# Patient Record
Sex: FEMALE | Race: WHITE | NOT HISPANIC OR LATINO | URBAN - METROPOLITAN AREA
[De-identification: names, ages, dates, MRNs, and addresses within clinical notes are randomized per-mention and may not be internally consistent; named-entity substitution may affect disease eponyms.]

---

## 2024-11-20 ENCOUNTER — OFFICE VISIT (OUTPATIENT)
Dept: PRIMARY CARE | Facility: EXTERNAL LOCATION | Age: 49
End: 2024-11-20

## 2024-11-20 VITALS
HEART RATE: 94 BPM | DIASTOLIC BLOOD PRESSURE: 86 MMHG | OXYGEN SATURATION: 96 % | SYSTOLIC BLOOD PRESSURE: 133 MMHG | BODY MASS INDEX: 29 KG/M2 | WEIGHT: 165 LBS | TEMPERATURE: 98 F

## 2024-11-20 DIAGNOSIS — H01.006 BLEPHARITIS OF BOTH EYES, UNSPECIFIED EYELID, UNSPECIFIED TYPE: Primary | ICD-10-CM

## 2024-11-20 DIAGNOSIS — H01.003 BLEPHARITIS OF BOTH EYES, UNSPECIFIED EYELID, UNSPECIFIED TYPE: Primary | ICD-10-CM

## 2024-11-20 PROBLEM — F17.200 TOBACCO DEPENDENCE: Status: ACTIVE | Noted: 2024-11-20

## 2024-11-20 PROBLEM — E78.5 HYPERLIPIDEMIA: Status: ACTIVE | Noted: 2024-11-20

## 2024-11-20 PROBLEM — E11.9 DIABETES MELLITUS (MULTI): Status: ACTIVE | Noted: 2024-11-20

## 2024-11-20 PROBLEM — F11.10 HEROIN ABUSE (MULTI): Status: ACTIVE | Noted: 2024-11-20

## 2024-11-20 PROBLEM — F41.9 ANXIETY: Status: ACTIVE | Noted: 2024-11-20

## 2024-11-20 RX ORDER — METFORMIN HYDROCHLORIDE 500 MG/1
TABLET, EXTENDED RELEASE ORAL
COMMUNITY
Start: 2024-10-25

## 2024-11-20 RX ORDER — FLUTICASONE PROPIONATE AND SALMETEROL 250; 50 UG/1; UG/1
1 POWDER RESPIRATORY (INHALATION) 2 TIMES DAILY
COMMUNITY
Start: 2024-09-26

## 2024-11-20 RX ORDER — TIOTROPIUM BROMIDE INHALATION SPRAY 1.56 UG/1
2 SPRAY, METERED RESPIRATORY (INHALATION)
COMMUNITY
Start: 2024-09-26

## 2024-11-20 RX ORDER — PANTOPRAZOLE SODIUM 40 MG/1
1 TABLET, DELAYED RELEASE ORAL
COMMUNITY
Start: 2024-02-01

## 2024-11-20 RX ORDER — LOSARTAN POTASSIUM 50 MG/1
50 TABLET ORAL
COMMUNITY

## 2024-11-20 RX ORDER — GABAPENTIN 300 MG/1
1 CAPSULE ORAL DAILY
COMMUNITY

## 2024-11-20 RX ORDER — ERYTHROMYCIN 5 MG/G
OINTMENT OPHTHALMIC 4 TIMES DAILY
Qty: 3.5 G | Refills: 0 | Status: SHIPPED | OUTPATIENT
Start: 2024-11-20 | End: 2024-11-27

## 2024-11-20 RX ORDER — ALBUTEROL SULFATE 90 UG/1
2-4 INHALANT RESPIRATORY (INHALATION) EVERY 2 HOUR PRN
COMMUNITY
Start: 2024-09-26

## 2024-11-20 ASSESSMENT — ENCOUNTER SYMPTOMS
EYE ITCHING: 0
CHILLS: 0
COUGH: 0
FEVER: 0
EYE PAIN: 0
EYE REDNESS: 1
SORE THROAT: 0
SINUS PAIN: 0
EYE DISCHARGE: 1
WHEEZING: 0
PALPITATIONS: 0
SHORTNESS OF BREATH: 0
PHOTOPHOBIA: 0

## 2024-11-20 ASSESSMENT — PATIENT HEALTH QUESTIONNAIRE - PHQ9
1. LITTLE INTEREST OR PLEASURE IN DOING THINGS: NOT AT ALL
2. FEELING DOWN, DEPRESSED OR HOPELESS: NOT AT ALL
SUM OF ALL RESPONSES TO PHQ9 QUESTIONS 1 AND 2: 0

## 2024-11-20 NOTE — PROGRESS NOTES
Subjective   Patient ID: Karlene Newman is a 49 y.o. female who presents for Eye Problem (Approx 1 month/Drainage throughout the day, feels goopy all of the time/Had to wipe with cloth to open in morning/).    HPI:  Complains of eyes draining. In the morning is yellow and crusted. During the day is white and watery.   Eyes have been slight red as well.   No itchiness but can be irritated.   No pain. Occasional eyelid pressure.   No FB sensation.   No light sensitivity.   Has had symptoms for a month.   Does not wear contacts.   No other symptoms.     No Known Allergies    Current Outpatient Medications   Medication Sig Dispense Refill    albuterol 90 mcg/actuation inhaler Inhale 2-4 puffs every 2 hours if needed.      fluticasone propion-salmeteroL (Advair Diskus) 250-50 mcg/dose diskus inhaler Inhale 1 puff twice a day.      gabapentin (Neurontin) 300 mg capsule Take 1 tablet by mouth once daily.      losartan (Cozaar) 50 mg tablet Take 1 tablet (50 mg) by mouth once daily.      metFORMIN  mg 24 hr tablet       pantoprazole (ProtoNix) 40 mg EC tablet Take 1 tablet (40 mg) by mouth once daily.      Spiriva Respimat 1.25 mcg/actuation inhaler Inhale 2 puffs once daily.       No current facility-administered medications for this visit.        Past Medical History:   Diagnosis Date    Asthma     Diabetes mellitus (Multi)     GERD (gastroesophageal reflux disease)     Heroin abuse (Multi) 11/20/2024    Hyperlipidemia 11/20/2024    Hypertension     Tobacco dependence 11/20/2024       Past Surgical History:   Procedure Laterality Date    ECTOPIC PREGNANCY SURGERY      TUBAL LIGATION         Review of Systems   Constitutional:  Negative for chills and fever.   HENT:  Negative for congestion, sinus pain and sore throat.    Eyes:  Positive for discharge and redness. Negative for photophobia, pain, itching and visual disturbance.   Respiratory:  Negative for cough, shortness of breath and wheezing.     Cardiovascular:  Negative for chest pain and palpitations.       Objective   Visit Vitals  /86   Pulse 94   Temp 36.7 °C (98 °F)   Wt 74.8 kg (165 lb)   LMP 11/10/2024   SpO2 96%   BMI 29.00 kg/m²   OB Status Having periods   Smoking Status Every Day   BSA 1.83 m²       Physical Exam  Constitutional:       General: She is not in acute distress.     Appearance: Normal appearance. She is not ill-appearing or toxic-appearing.      Comments: Smiling, cooperative, NAD. Looks well   HENT:      Right Ear: Tympanic membrane, ear canal and external ear normal.      Left Ear: Tympanic membrane, ear canal and external ear normal.      Nose: Nose normal.      Mouth/Throat:      Mouth: Mucous membranes are moist.      Pharynx: Oropharynx is clear.   Eyes:      General:         Right eye: Discharge (clear) present.         Left eye: Discharge (clear) present.     Extraocular Movements: Extraocular movements intact.      Pupils: Pupils are equal, round, and reactive to light.      Comments: Conjunctiva injected (mildly red) JENNIFER, EOMI, PERRLA, scant clear discharge noted    Pulmonary:      Effort: Pulmonary effort is normal.      Breath sounds: Normal breath sounds.   Musculoskeletal:      Cervical back: Neck supple.   Neurological:      Mental Status: She is alert.   Psychiatric:         Mood and Affect: Mood normal.         Behavior: Behavior normal.         Thought Content: Thought content normal.       Assessment/Plan   Diagnoses and all orders for this visit:  Blepharitis of both eyes, unspecified eyelid, unspecified type  -     erythromycin (Romycin) 5 mg/gram (0.5 %) ophthalmic ointment; Apply to affected eye(s) 4 times a day for 7 days. Apply 0.5in per dose.    - Discussed cleaning/wiping eyes.   - Educational information on blepharitis printed and given to patient.   - Prescription sent to pharmacy and possible side effects discussed.   - Recommend OTC allergy eye drops as needed.   - Follow up in 1 week if no  improvement. Sooner with concerns or worsening symptoms.

## 2024-11-21 NOTE — PATIENT INSTRUCTIONS
Instructed to:  - Wash your hands often with soap and water.  - Try not to touch your eyes.  - Avoid sharing towels, bedding, or other personal items.  - Avoid wearing contacts until symptoms completely resolve.    Call your healthcare provider or go to an Urgent Care if:  - Your symptoms are worsening.  - You have trouble seeing clearly after blinking.  - Your eye is still red or has drainage after 3 days.  - The drainage/discharge from your eye is yellow/green  - You have eye pain that is getting worse.  - You have light sensitivity and are having trouble keeping your eye open  - You feel there may be something in your eye.    - Any worsening symptoms.

## 2024-12-18 DIAGNOSIS — E78.00 PURE HYPERCHOLESTEROLEMIA, UNSPECIFIED: ICD-10-CM

## 2024-12-18 DIAGNOSIS — E11.65 TYPE 2 DIABETES MELLITUS WITH HYPERGLYCEMIA (MULTI): Primary | ICD-10-CM

## 2025-01-07 ENCOUNTER — OFFICE VISIT (OUTPATIENT)
Dept: OPHTHALMOLOGY | Facility: CLINIC | Age: 50
End: 2025-01-07
Payer: COMMERCIAL

## 2025-01-07 DIAGNOSIS — H25.813 COMBINED FORMS OF AGE-RELATED CATARACT OF BOTH EYES: ICD-10-CM

## 2025-01-07 DIAGNOSIS — H04.123 DRY EYES: ICD-10-CM

## 2025-01-07 DIAGNOSIS — H52.7 REFRACTIVE ERROR: ICD-10-CM

## 2025-01-07 DIAGNOSIS — E11.9 TYPE 2 DIABETES MELLITUS WITHOUT COMPLICATION, WITHOUT LONG-TERM CURRENT USE OF INSULIN (MULTI): Primary | ICD-10-CM

## 2025-01-07 DIAGNOSIS — H02.831 DERMATOCHALASIS OF BOTH UPPER EYELIDS: ICD-10-CM

## 2025-01-07 DIAGNOSIS — H02.834 DERMATOCHALASIS OF BOTH UPPER EYELIDS: ICD-10-CM

## 2025-01-07 PROCEDURE — 99214 OFFICE O/P EST MOD 30 MIN: CPT | Performed by: OPHTHALMOLOGY

## 2025-01-07 PROCEDURE — 99204 OFFICE O/P NEW MOD 45 MIN: CPT | Performed by: OPHTHALMOLOGY

## 2025-01-07 ASSESSMENT — EXTERNAL EXAM - LEFT EYE: OS_EXAM: NORMAL

## 2025-01-07 ASSESSMENT — PATIENT HEALTH QUESTIONNAIRE - PHQ9
2. FEELING DOWN, DEPRESSED OR HOPELESS: NOT AT ALL
SUM OF ALL RESPONSES TO PHQ9 QUESTIONS 1 AND 2: 0
1. LITTLE INTEREST OR PLEASURE IN DOING THINGS: NOT AT ALL

## 2025-01-07 ASSESSMENT — REFRACTION_WEARINGRX
OS_CYLINDER: -2.75
OD_CYLINDER: -1.75
OD_AXIS: 086
OS_AXIS: 099
OS_ADD: +2.00
OD_SPHERE: -0.75
OD_ADD: +2.00
SPECS_TYPE: BIFOCAL
OS_SPHERE: -0.25

## 2025-01-07 ASSESSMENT — ENCOUNTER SYMPTOMS
HEMATOLOGIC/LYMPHATIC NEGATIVE: 0
ENDOCRINE NEGATIVE: 0
NEUROLOGICAL NEGATIVE: 0
GASTROINTESTINAL NEGATIVE: 0
EYES NEGATIVE: 0
PSYCHIATRIC NEGATIVE: 0
CONSTITUTIONAL NEGATIVE: 0
CARDIOVASCULAR NEGATIVE: 0
ALLERGIC/IMMUNOLOGIC NEGATIVE: 0
RESPIRATORY NEGATIVE: 0
MUSCULOSKELETAL NEGATIVE: 0

## 2025-01-07 ASSESSMENT — SLIT LAMP EXAM - LIDS
COMMENTS: 1+ DERMATOCHALASIS - UPPER LID, 1+ BLEPHARITIS
COMMENTS: 1+ DERMATOCHALASIS - UPPER LID, 1+ BLEPHARITIS

## 2025-01-07 ASSESSMENT — VISUAL ACUITY
OS_CC: 20/20
OS_CC+: -2
METHOD: SNELLEN - LINEAR
OD_PH_CC: 20/50
CORRECTION_TYPE: GLASSES
OD_CC: 20/200

## 2025-01-07 ASSESSMENT — TONOMETRY
OS_IOP_MMHG: 17
OD_IOP_MMHG: 17
IOP_METHOD: GOLDMANN APPLANATION

## 2025-01-07 ASSESSMENT — CUP TO DISC RATIO
OD_RATIO: 0.35
OS_RATIO: 0.35

## 2025-01-07 ASSESSMENT — EXTERNAL EXAM - RIGHT EYE: OD_EXAM: NORMAL

## 2025-01-07 ASSESSMENT — PAIN SCALES - GENERAL: PAINLEVEL_OUTOF10: 0-NO PAIN

## 2025-01-07 NOTE — PROGRESS NOTES
Subjective   Patient ID: Karlene Newman is a 49 y.o. female.      HPI    Intraocular pressure (IOP) [facility], diagnosed as diabetic 15 yrs ago.  States ou tear often and discharge daily     Dilated diabetic exam.  Has not seen an ophthal in years.  Vision has worsened.  OD>>>OS.  No recent changes in health history or meds.    Last edited by Davion Mckeon MD on 1/7/2025  2:52 PM.        No current outpatient medications on file. (Ophthalmology pharm classes)       Current Outpatient Medications (Other)   Medication Sig Dispense Refill    albuterol 90 mcg/actuation inhaler Inhale 2-4 puffs every 2 hours if needed.      fluticasone propion-salmeteroL (Advair Diskus) 250-50 mcg/dose diskus inhaler Inhale 1 puff twice a day.      gabapentin (Neurontin) 300 mg capsule Take 1 tablet by mouth once daily.      losartan (Cozaar) 50 mg tablet Take 1 tablet (50 mg) by mouth once daily.      metFORMIN  mg 24 hr tablet       pantoprazole (ProtoNix) 40 mg EC tablet Take 1 tablet (40 mg) by mouth once daily.      Spiriva Respimat 1.25 mcg/actuation inhaler Inhale 2 puffs once daily.         Objective   Base Eye Exam       Visual Acuity (Snellen - Linear)         Right Left    Dist cc 20/200 20/20 -2    Dist ph cc 20/50       Correction: Glasses              Tonometry (Goldmann Applanation, 1:40 PM)         Right Left    Pressure 17 17              Pupils         Dark Shape React APD    Right 4 Round 2 None    Left 4 Round 2 None              Extraocular Movement         Right Left     Full Full              Dilation       Both eyes: 1% Tropic 2.5% Phen @ 1:39 PM                  Slit Lamp and Fundus Exam       External Exam         Right Left    External Normal Normal              Slit Lamp Exam         Right Left    Lids/Lashes 1+ Dermatochalasis - upper lid, 1+ Blepharitis 1+ Dermatochalasis - upper lid, 1+ Blepharitis    Conjunctiva/Sclera White and quiet White and quiet     Cornea Clear Clear    Anterior Chamber Deep and quiet Deep and quiet    Iris Round and reactive Round and reactive    Lens Trace Nuclear sclerosis Trace Nuclear sclerosis    Anterior Vitreous Vitreous syneresis Vitreous syneresis              Fundus Exam         Right Left    Disc Normal Normal    C/D Ratio 0.35 0.35    Macula Normal; no BDR Normal; no BDR    Vessels Normal Normal    Periphery Normal Normal                  Refraction       Wearing Rx         Sphere Cylinder Axis Add    Right -0.75 -1.75 086 +2.00    Left -0.25 -2.75 099 +2.00      Age: 1yrs    Type: Bifocal                    Assessment/Plan   Problem List Items Addressed This Visit          Eye/Vision problems    Diabetes mellitus (Multi) - Primary    Refractive error     F/u next avail refraction.           Dry eyes     Begin daily art tear usage.           Combined forms of age-related cataract of both eyes    Dermatochalasis of both upper eyelids

## 2025-01-07 NOTE — LETTER
January 7, 2025    Lorelei Umanzor MD  [id number]  [facility] [address]     Patient: Karlene Newman   YOB: 1975   Date of Visit: 1/7/2025       Dear Dr. Lorelei Umanzor MD:    Thank you for referring Karlene Newman to me for evaluation. Here is my assessment and plan of care:    Assessment/Plan:  Diagnoses and all orders for this visit:  Type 2 diabetes mellitus without complication, without long-term current use of insulin (Multi) (Primary)  Dry eyes  Combined forms of age-related cataract of both eyes  Dermatochalasis of both upper eyelids  Refractive error    Below you will find my full exam findings. If you have questions, please do not hesitate to call me. I look forward to following Karlene along with you.     No signs of background diabetic retinopathy (BDR) OU.      Sincerely,        Davion Mckeon MD        CC:   No Recipients        Base Eye Exam       Visual Acuity (Snellen - Linear)         Right Left    Dist cc 20/200 20/20 -2    Dist ph cc 20/50       Correction: Glasses              Tonometry (Goldmann Applanation, 1:40 PM)         Right Left    Pressure 17 17              Pupils         Dark Shape React APD    Right 4 Round 2 None    Left 4 Round 2 None              Extraocular Movement         Right Left     Full Full              Dilation       Both eyes: 1% Tropic 2.5% Phen @ 1:39 PM                  Slit Lamp and Fundus Exam       External Exam         Right Left    External Normal Normal              Slit Lamp Exam         Right Left    Lids/Lashes 1+ Dermatochalasis - upper lid, 1+ Blepharitis 1+ Dermatochalasis - upper lid, 1+ Blepharitis    Conjunctiva/Sclera White and quiet White and quiet    Cornea Clear Clear    Anterior Chamber Deep and quiet Deep and quiet    Iris Round and reactive Round and reactive    Lens Trace Nuclear sclerosis Trace Nuclear sclerosis    Anterior Vitreous Vitreous syneresis Vitreous syneresis              Fundus Exam          Right Left    Disc Normal Normal    C/D Ratio 0.35 0.35    Macula Normal; no BDR Normal; no BDR    Vessels Normal Normal    Periphery Normal Normal                  Refraction       Wearing Rx         Sphere Cylinder Axis Add    Right -0.75 -1.75 086 +2.00    Left -0.25 -2.75 099 +2.00      Age: 1yrs    Type: Bifocal

## 2025-01-17 ENCOUNTER — HOSPITAL ENCOUNTER (OUTPATIENT)
Dept: RADIOLOGY | Facility: HOSPITAL | Age: 50
Discharge: HOME | End: 2025-01-17
Payer: COMMERCIAL

## 2025-01-17 ENCOUNTER — LAB (OUTPATIENT)
Dept: LAB | Facility: LAB | Age: 50
End: 2025-01-17
Payer: COMMERCIAL

## 2025-01-17 DIAGNOSIS — E11.65 TYPE 2 DIABETES MELLITUS WITH HYPERGLYCEMIA (MULTI): Primary | ICD-10-CM

## 2025-01-17 DIAGNOSIS — R05.9 COUGH, UNSPECIFIED: ICD-10-CM

## 2025-01-17 DIAGNOSIS — E78.00 PURE HYPERCHOLESTEROLEMIA, UNSPECIFIED: ICD-10-CM

## 2025-01-17 LAB
ALBUMIN SERPL BCP-MCNC: 4.3 G/DL (ref 3.4–5)
ALP SERPL-CCNC: 52 U/L (ref 33–110)
ALT SERPL W P-5'-P-CCNC: 14 U/L (ref 7–45)
ANION GAP SERPL CALCULATED.3IONS-SCNC: 13 MMOL/L (ref 10–20)
AST SERPL W P-5'-P-CCNC: 17 U/L (ref 9–39)
BASOPHILS # BLD AUTO: 0.07 X10*3/UL (ref 0–0.1)
BASOPHILS NFR BLD AUTO: 1.1 %
BILIRUB SERPL-MCNC: 0.4 MG/DL (ref 0–1.2)
BUN SERPL-MCNC: 10 MG/DL (ref 6–23)
CALCIUM SERPL-MCNC: 9.4 MG/DL (ref 8.6–10.3)
CHLORIDE SERPL-SCNC: 101 MMOL/L (ref 98–107)
CHOLEST SERPL-MCNC: 220 MG/DL (ref 0–199)
CHOLEST/HDLC SERPL: 3.1 {RATIO}
CO2 SERPL-SCNC: 30 MMOL/L (ref 21–32)
CREAT SERPL-MCNC: 0.68 MG/DL (ref 0.5–1.05)
CREAT UR-MCNC: 21.9 MG/DL (ref 20–320)
EGFRCR SERPLBLD CKD-EPI 2021: >90 ML/MIN/1.73M*2
EOSINOPHIL # BLD AUTO: 0.53 X10*3/UL (ref 0–0.7)
EOSINOPHIL NFR BLD AUTO: 8.5 %
ERYTHROCYTE [DISTWIDTH] IN BLOOD BY AUTOMATED COUNT: 13.3 % (ref 11.5–14.5)
ERYTHROCYTE [SEDIMENTATION RATE] IN BLOOD BY WESTERGREN METHOD: 6 MM/H (ref 0–30)
EST. AVERAGE GLUCOSE BLD GHB EST-MCNC: 117 MG/DL
GLUCOSE SERPL-MCNC: 88 MG/DL (ref 74–99)
HBA1C MFR BLD: 5.7 %
HCT VFR BLD AUTO: 43.1 % (ref 36–46)
HDLC SERPL-MCNC: 71.8 MG/DL
HGB BLD-MCNC: 14.2 G/DL (ref 12–16)
IMM GRANULOCYTES # BLD AUTO: 0.02 X10*3/UL (ref 0–0.7)
IMM GRANULOCYTES NFR BLD AUTO: 0.3 % (ref 0–0.9)
LDLC SERPL CALC-MCNC: 102 MG/DL
LYMPHOCYTES # BLD AUTO: 2.26 X10*3/UL (ref 1.2–4.8)
LYMPHOCYTES NFR BLD AUTO: 36 %
MCH RBC QN AUTO: 31.1 PG (ref 26–34)
MCHC RBC AUTO-ENTMCNC: 32.9 G/DL (ref 32–36)
MCV RBC AUTO: 94 FL (ref 80–100)
MICROALBUMIN UR-MCNC: <7 MG/L
MICROALBUMIN/CREAT UR: NORMAL MG/G{CREAT}
MONOCYTES # BLD AUTO: 0.35 X10*3/UL (ref 0.1–1)
MONOCYTES NFR BLD AUTO: 5.6 %
NEUTROPHILS # BLD AUTO: 3.04 X10*3/UL (ref 1.2–7.7)
NEUTROPHILS NFR BLD AUTO: 48.5 %
NON HDL CHOLESTEROL: 148 MG/DL (ref 0–149)
NRBC BLD-RTO: 0 /100 WBCS (ref 0–0)
PLATELET # BLD AUTO: 250 X10*3/UL (ref 150–450)
POTASSIUM SERPL-SCNC: 5.1 MMOL/L (ref 3.5–5.3)
PROT SERPL-MCNC: 7.2 G/DL (ref 6.4–8.2)
RBC # BLD AUTO: 4.57 X10*6/UL (ref 4–5.2)
SODIUM SERPL-SCNC: 139 MMOL/L (ref 136–145)
TRIGL SERPL-MCNC: 230 MG/DL (ref 0–149)
VLDL: 46 MG/DL (ref 0–40)
WBC # BLD AUTO: 6.3 X10*3/UL (ref 4.4–11.3)

## 2025-01-17 PROCEDURE — 80061 LIPID PANEL: CPT

## 2025-01-17 PROCEDURE — 71046 X-RAY EXAM CHEST 2 VIEWS: CPT

## 2025-01-17 PROCEDURE — 80053 COMPREHEN METABOLIC PANEL: CPT

## 2025-01-17 PROCEDURE — 82043 UR ALBUMIN QUANTITATIVE: CPT

## 2025-01-17 PROCEDURE — 85652 RBC SED RATE AUTOMATED: CPT

## 2025-01-17 PROCEDURE — 85025 COMPLETE CBC W/AUTO DIFF WBC: CPT

## 2025-01-17 PROCEDURE — 82570 ASSAY OF URINE CREATININE: CPT

## 2025-01-17 PROCEDURE — 83036 HEMOGLOBIN GLYCOSYLATED A1C: CPT

## 2025-01-28 ENCOUNTER — APPOINTMENT (OUTPATIENT)
Dept: OPHTHALMOLOGY | Facility: CLINIC | Age: 50
End: 2025-01-28
Payer: COMMERCIAL

## 2025-08-08 ENCOUNTER — HOSPITAL ENCOUNTER (OUTPATIENT)
Dept: RADIOLOGY | Facility: HOSPITAL | Age: 50
Discharge: HOME | End: 2025-08-08
Payer: COMMERCIAL

## 2025-08-08 DIAGNOSIS — R05.9 COUGH, UNSPECIFIED: ICD-10-CM

## 2025-08-08 PROCEDURE — 71046 X-RAY EXAM CHEST 2 VIEWS: CPT

## 2025-08-08 PROCEDURE — 71046 X-RAY EXAM CHEST 2 VIEWS: CPT | Performed by: RADIOLOGY

## 2025-08-09 NOTE — PROGRESS NOTES
GYNECOLOGY OFFICE VISIT   Subjective     History Of Present Illness:    Karlene Newman is a 50 y.o. No obstetric history on file. presenting for No chief complaint on file.     Review of Systems:  Denies abdominal pain, pelvic pain, nausea, vomiting, urinary frequency, dysuria, hematuria, constipation, hematochezia, abnormal bleeding, abnormal vaginal discharge, or dyspareunia.     Menstrual History:  OB History    No obstetric history on file.        Menarche age: ***    No LMP recorded.       Last Pap Smear:  Result Date Procedure Results Follow-ups   11/25/2020 CONVERTED GYN CYTOLOGY Pap Smear: ASC-US (A)  HPV: HRHPV -      Last Mammogram:      Last Colon Cancer Screening:    Past Medical History:  Medical History[1]    Past Surgical History:  Surgical History[2]      Social History:  Social History     Tobacco Use    Smoking status: Every Day     Types: Cigarettes    Smokeless tobacco: Never   Substance Use Topics    Alcohol use: Yes     Comment: social       Family History:  Family History[3]     Allergies:  Patient has no known allergies.    Outpatient Medications:  Current Outpatient Medications   Medication Instructions    albuterol 90 mcg/actuation inhaler 2-4 puffs, Every 2 hour PRN    fluticasone propion-salmeteroL (Advair Diskus) 250-50 mcg/dose diskus inhaler 1 puff, 2 times daily    gabapentin (Neurontin) 300 mg capsule 1 tablet, Daily    losartan (COZAAR) 50 mg, Daily RT    metFORMIN  mg 24 hr tablet     pantoprazole (ProtoNix) 40 mg EC tablet 1 tablet, Daily RT    Spiriva Respimat 1.25 mcg/actuation inhaler 2 puffs, Daily RT     Objective   Last Recorded Vitals:  There were no vitals taken for this visit.    Physical Exam:  General: Alert and oriented, in no acute distress.  Psych: Normal affect and mood. Able to answer questions appropriately.   Lungs: Non labored respirations.  Abdomen: Soft, non-tender, without masses or organomegaly.  GYN:  Speculum:  Vulva: Normal architecture without  erythema, masses, or lesions.   Vagina: Normal moist mucosa without lesions, masses, or atrophy. No abnormal vaginal discharge.   Cervix: Normal without erythema, masses, lesions, or signs of cervicitis.  Bimanual:   Cervix: No cervical motion tenderness.  Uterus: Normal, mobile, non-enlarged, non tender uterus.  Adnexa: Normal without tenderness, nodularity, masses, or lesions.    Assessment/Plan     50 y.o. No obstetric history on file. , here for ***    Assessment & Plan  Screening for cervical cancer         Colon cancer screening         Screening mammogram for breast cancer               Kenyetta Barber MD       [1]   Past Medical History:  Diagnosis Date    Asthma     Diabetes mellitus (Multi)     GERD (gastroesophageal reflux disease)     Heroin abuse (Multi) 11/20/2024    Hyperlipidemia 11/20/2024    Hypertension     Tobacco dependence 11/20/2024   [2]   Past Surgical History:  Procedure Laterality Date    ECTOPIC PREGNANCY SURGERY      TUBAL LIGATION     [3]   Family History  Problem Relation Name Age of Onset    Glaucoma Maternal Grandmother

## 2025-08-13 ENCOUNTER — APPOINTMENT (OUTPATIENT)
Dept: OBSTETRICS AND GYNECOLOGY | Facility: CLINIC | Age: 50
End: 2025-08-13
Payer: COMMERCIAL

## 2025-08-13 DIAGNOSIS — Z12.31 SCREENING MAMMOGRAM FOR BREAST CANCER: ICD-10-CM

## 2025-08-13 DIAGNOSIS — Z12.4 SCREENING FOR CERVICAL CANCER: Primary | ICD-10-CM

## 2025-08-13 DIAGNOSIS — Z12.11 COLON CANCER SCREENING: ICD-10-CM
